# Patient Record
Sex: FEMALE | NOT HISPANIC OR LATINO | Employment: FULL TIME | URBAN - METROPOLITAN AREA
[De-identification: names, ages, dates, MRNs, and addresses within clinical notes are randomized per-mention and may not be internally consistent; named-entity substitution may affect disease eponyms.]

---

## 2023-08-04 ASSESSMENT — PROMIS GLOBAL HEALTH SCALE
EMOTIONAL_PROBLEMS: RARELY
RATE_QUALITY_OF_LIFE: VERY GOOD
RATE_AVERAGE_PAIN: 2
CARRYOUT_SOCIAL_ACTIVITIES: VERY GOOD
RATE_GENERAL_HEALTH: VERY GOOD
CARRYOUT_PHYSICAL_ACTIVITIES: COMPLETELY
RATE_SOCIAL_SATISFACTION: VERY GOOD
RATE_AVERAGE_FATIGUE: MILD
RATE_MENTAL_HEALTH: VERY GOOD
RATE_PHYSICAL_HEALTH: VERY GOOD

## 2023-08-08 ENCOUNTER — OFFICE VISIT (OUTPATIENT)
Dept: PRIMARY CARE | Facility: CLINIC | Age: 43
End: 2023-08-08
Payer: COMMERCIAL

## 2023-08-08 VITALS
WEIGHT: 121 LBS | BODY MASS INDEX: 24.03 KG/M2 | DIASTOLIC BLOOD PRESSURE: 80 MMHG | SYSTOLIC BLOOD PRESSURE: 120 MMHG | HEART RATE: 70 BPM

## 2023-08-08 DIAGNOSIS — H90.3 SENSORINEURAL HEARING LOSS OF BOTH EARS: ICD-10-CM

## 2023-08-08 DIAGNOSIS — G43.109 MIGRAINE WITH AURA AND WITHOUT STATUS MIGRAINOSUS, NOT INTRACTABLE: ICD-10-CM

## 2023-08-08 DIAGNOSIS — Z00.00 ANNUAL PHYSICAL EXAM: Primary | ICD-10-CM

## 2023-08-08 DIAGNOSIS — Z11.4 ENCOUNTER FOR SCREENING FOR HIV: ICD-10-CM

## 2023-08-08 DIAGNOSIS — Z11.59 NEED FOR HEPATITIS C SCREENING TEST: ICD-10-CM

## 2023-08-08 PROBLEM — H91.90 HEARING LOSS: Status: RESOLVED | Noted: 2023-08-08 | Resolved: 2023-08-08

## 2023-08-08 PROBLEM — B34.9 VIRAL ILLNESS: Status: RESOLVED | Noted: 2023-08-08 | Resolved: 2023-08-08

## 2023-08-08 PROBLEM — B34.9 VIRAL ILLNESS: Status: ACTIVE | Noted: 2023-08-08

## 2023-08-08 PROBLEM — S92.901A FOOT FRACTURE, RIGHT: Status: RESOLVED | Noted: 2023-08-08 | Resolved: 2023-08-08

## 2023-08-08 PROBLEM — S92.901A FOOT FRACTURE, RIGHT: Status: ACTIVE | Noted: 2023-08-08

## 2023-08-08 PROBLEM — H91.90 HEARING LOSS: Status: ACTIVE | Noted: 2023-08-08

## 2023-08-08 PROBLEM — L30.9 ECZEMA: Status: ACTIVE | Noted: 2023-08-08

## 2023-08-08 PROBLEM — L30.9 ECZEMA: Status: RESOLVED | Noted: 2023-08-08 | Resolved: 2023-08-08

## 2023-08-08 PROBLEM — H93.13 SUBJECTIVE TINNITUS OF BOTH EARS: Status: ACTIVE | Noted: 2023-08-08

## 2023-08-08 PROBLEM — H90.6 MIXED CONDUCTIVE AND SENSORINEURAL HEARING LOSS OF BOTH EARS: Status: ACTIVE | Noted: 2023-08-08

## 2023-08-08 PROBLEM — H90.6 MIXED CONDUCTIVE AND SENSORINEURAL HEARING LOSS OF BOTH EARS: Status: RESOLVED | Noted: 2023-08-08 | Resolved: 2023-08-08

## 2023-08-08 LAB
POC APPEARANCE, URINE: CLEAR
POC BILIRUBIN, URINE: NEGATIVE
POC BLOOD, URINE: NEGATIVE
POC COLOR, URINE: YELLOW
POC GLUCOSE, URINE: NEGATIVE MG/DL
POC KETONES, URINE: NEGATIVE MG/DL
POC LEUKOCYTES, URINE: NEGATIVE
POC NITRITE,URINE: NEGATIVE
POC PH, URINE: 6 PH
POC PROTEIN, URINE: NEGATIVE MG/DL
POC SPECIFIC GRAVITY, URINE: 1.02
POC UROBILINOGEN, URINE: 0.2 EU/DL

## 2023-08-08 PROCEDURE — 93000 ELECTROCARDIOGRAM COMPLETE: CPT | Performed by: FAMILY MEDICINE

## 2023-08-08 PROCEDURE — 81002 URINALYSIS NONAUTO W/O SCOPE: CPT | Performed by: FAMILY MEDICINE

## 2023-08-08 PROCEDURE — 1036F TOBACCO NON-USER: CPT | Performed by: FAMILY MEDICINE

## 2023-08-08 PROCEDURE — 99396 PREV VISIT EST AGE 40-64: CPT | Performed by: FAMILY MEDICINE

## 2023-08-08 RX ORDER — LEVONORGESTREL 52 MG/1
INTRAUTERINE DEVICE INTRAUTERINE
COMMUNITY
Start: 2018-08-30

## 2023-08-08 ASSESSMENT — PATIENT HEALTH QUESTIONNAIRE - PHQ9
1. LITTLE INTEREST OR PLEASURE IN DOING THINGS: NOT AT ALL
SUM OF ALL RESPONSES TO PHQ9 QUESTIONS 1 AND 2: 0
2. FEELING DOWN, DEPRESSED OR HOPELESS: NOT AT ALL

## 2023-08-08 NOTE — ASSESSMENT & PLAN NOTE
Would consider trying to make an ENT appointment who has an audiologist in his group so that you can take care of both at the same appointment visit

## 2023-08-08 NOTE — PROGRESS NOTES
Subjective   Patient ID: Galina Rocha is a 42 y.o. female who presents for Annual Exam.    HPI  1.  Physical exam.  Galina is presenting for her annual wellness exam.  Overall patient reports doing well. Her only complaint is an unintentional weight gain of about ten pounds. She walks during her lunch break, does BARRE, and goes to [employer].   Has a well balanced diet.   Drinks about ten alcoholic beverages a week.  Pap: up to date, will establish with Dr. Locke in the fall as her new gynecologist  Mammogram: [facility] in 2022 that was unremarkable.   Immunizations: Tdap 5/2019    2.  Migraine headaches.  Migraine history, relatively well-controlled, not intractable  Has a medical marijuana card for which she will use medication on an as-needed basis, infrequently    3.  Sensorineural hearing loss.  Made an appointment in the near future to be seen by ENT as she thinks her hearing aids are not as effective as they have been in the past  Started wearing hearing aids in 2019    Review of Systems  No new complaints including chest pain, SOB or blood in stools.    All pertinent positive symptoms are included in the history of present illness.    All other systems have been reviewed and are negative and noncontributory to this patient's current ailments.    Current Outpatient Medications   Medication Instructions    levonorgestrel (Mirena) 21 mcg/24 hours (8 yrs) 52 mg IUD intrauterine     No Known Allergies    Immunization History   Administered Date(s) Administered    Moderna SARS-CoV-2 Vaccination 03/22/2021, 04/16/2021    Pfizer Purple Cap SARS-CoV-2 12/29/2021    Tdap vaccine, age 10 years and older (BOOSTRIX) 05/10/2019     Past Surgical History:   Procedure Laterality Date    OTHER SURGICAL HISTORY  08/30/2018    Prior Surgical Procedure Not Done     No family history on file.  Social History     Tobacco Use    Smoking status: Never    Smokeless tobacco: Never       Objective   Visit Vitals  /80    Pulse 70   Wt 54.9 kg (121 lb)   BMI 24.03 kg/m²   Smoking Status Never   BSA 1.52 m²       Physical Exam  CONSTITUTIONAL - well nourished, well developed, looks like stated age, in no acute distress, not ill-appearing, and not tired appearing  SKIN - normal skin color and pigmentation, normal skin turgor without rash, lesions, or nodules visualized  HEAD - no trauma, normocephalic  EYES - pupils are equal and reactive to light, extraocular muscles are intact, and normal external exam  ENT - + bilateral hearing aids; no signs of infection, uvula midline, normal tongue movement and throat normal, no exudate, nasal passage without discharge and patent  NECK - supple without rigidity, no neck mass was observed, no thyromegaly or thyroid nodules  CHEST - clear to auscultation, no wheezing, no crackles and no rales, good effort  CARDIAC - regular rate and regular rhythm, no skipped beats, subtle 1/6 TYREL best heard at right upper sternal border  ABDOMEN - no organomegaly, soft, nontender, nondistended, normal bowel sounds, no guarding/rebound/rigidity, negative McBurney sign and negative Cary sign  EXTREMITIES - no edema, no deformities  NEUROLOGICAL - normal gait, normal balance, normal motor, no ataxia, DTRs equal and symmetrical; alert, oriented and no focal signs  PSYCHIATRIC - alert, pleasant and cordial, age-appropriate  IMMUNOLOGIC - no cervical lymphadenopathy     Assessment/Plan   Problem List Items Addressed This Visit       Migraine headache with aura     Currently stable         Sensorineural hearing loss of both ears     Would consider trying to make an ENT appointment who has an audiologist in his group so that you can take care of both at the same appointment visit         Annual physical exam - Primary    Relevant Orders    Lipid Panel    TSH with reflex to Free T4 if abnormal    Comprehensive Metabolic Panel    CBC and Auto Differential    HIV 1/2 Antigen/Antibody Screen with Reflex to Confirmation     Hepatitis C Antibody    ECG 12 lead (Completed)    POCT UA (nonautomated) manually resulted (Completed)     Other Visit Diagnoses       Need for hepatitis C screening test        Relevant Orders    Hepatitis C Antibody    Encounter for screening for HIV        Relevant Orders    HIV 1/2 Antigen/Antibody Screen with Reflex to Confirmation

## 2023-08-19 ENCOUNTER — LAB (OUTPATIENT)
Dept: LAB | Facility: LAB | Age: 43
End: 2023-08-19
Payer: COMMERCIAL

## 2023-08-19 DIAGNOSIS — Z11.59 NEED FOR HEPATITIS C SCREENING TEST: ICD-10-CM

## 2023-08-19 DIAGNOSIS — Z00.00 ANNUAL PHYSICAL EXAM: ICD-10-CM

## 2023-08-19 DIAGNOSIS — Z11.4 ENCOUNTER FOR SCREENING FOR HIV: ICD-10-CM

## 2023-08-19 LAB
ALANINE AMINOTRANSFERASE (SGPT) (U/L) IN SER/PLAS: 21 U/L (ref 7–45)
ALBUMIN (G/DL) IN SER/PLAS: 4.7 G/DL (ref 3.4–5)
ALKALINE PHOSPHATASE (U/L) IN SER/PLAS: 48 U/L (ref 33–110)
ANION GAP IN SER/PLAS: 15 MMOL/L (ref 10–20)
ASPARTATE AMINOTRANSFERASE (SGOT) (U/L) IN SER/PLAS: 25 U/L (ref 9–39)
BASOPHILS (10*3/UL) IN BLOOD BY AUTOMATED COUNT: 0.07 X10E9/L (ref 0–0.1)
BASOPHILS/100 LEUKOCYTES IN BLOOD BY AUTOMATED COUNT: 1.1 % (ref 0–2)
BILIRUBIN TOTAL (MG/DL) IN SER/PLAS: 0.5 MG/DL (ref 0–1.2)
CALCIUM (MG/DL) IN SER/PLAS: 9.5 MG/DL (ref 8.6–10.3)
CARBON DIOXIDE, TOTAL (MMOL/L) IN SER/PLAS: 26 MMOL/L (ref 21–32)
CHLORIDE (MMOL/L) IN SER/PLAS: 103 MMOL/L (ref 98–107)
CHOLESTEROL (MG/DL) IN SER/PLAS: 175 MG/DL (ref 0–199)
CHOLESTEROL IN HDL (MG/DL) IN SER/PLAS: 106.5 MG/DL
CHOLESTEROL/HDL RATIO: 1.6
CREATININE (MG/DL) IN SER/PLAS: 0.72 MG/DL (ref 0.5–1.05)
EOSINOPHILS (10*3/UL) IN BLOOD BY AUTOMATED COUNT: 0.18 X10E9/L (ref 0–0.7)
EOSINOPHILS/100 LEUKOCYTES IN BLOOD BY AUTOMATED COUNT: 2.9 % (ref 0–6)
ERYTHROCYTE DISTRIBUTION WIDTH (RATIO) BY AUTOMATED COUNT: 12.8 % (ref 11.5–14.5)
ERYTHROCYTE MEAN CORPUSCULAR HEMOGLOBIN CONCENTRATION (G/DL) BY AUTOMATED: 32.3 G/DL (ref 32–36)
ERYTHROCYTE MEAN CORPUSCULAR VOLUME (FL) BY AUTOMATED COUNT: 94 FL (ref 80–100)
ERYTHROCYTES (10*6/UL) IN BLOOD BY AUTOMATED COUNT: 4.65 X10E12/L (ref 4–5.2)
GFR FEMALE: >90 ML/MIN/1.73M2
GLUCOSE (MG/DL) IN SER/PLAS: 91 MG/DL (ref 74–99)
HEMATOCRIT (%) IN BLOOD BY AUTOMATED COUNT: 43.6 % (ref 36–46)
HEMOGLOBIN (G/DL) IN BLOOD: 14.1 G/DL (ref 12–16)
HEPATITIS C VIRUS AB PRESENCE IN SERUM: NONREACTIVE
HIV 1/ 2 AG/AB SCREEN: NONREACTIVE
IMMATURE GRANULOCYTES/100 LEUKOCYTES IN BLOOD BY AUTOMATED COUNT: 0.3 % (ref 0–0.9)
LDL: 46 MG/DL (ref 0–99)
LEUKOCYTES (10*3/UL) IN BLOOD BY AUTOMATED COUNT: 6.3 X10E9/L (ref 4.4–11.3)
LYMPHOCYTES (10*3/UL) IN BLOOD BY AUTOMATED COUNT: 1.7 X10E9/L (ref 1.2–4.8)
LYMPHOCYTES/100 LEUKOCYTES IN BLOOD BY AUTOMATED COUNT: 27.1 % (ref 13–44)
MONOCYTES (10*3/UL) IN BLOOD BY AUTOMATED COUNT: 0.38 X10E9/L (ref 0.1–1)
MONOCYTES/100 LEUKOCYTES IN BLOOD BY AUTOMATED COUNT: 6.1 % (ref 2–10)
NEUTROPHILS (10*3/UL) IN BLOOD BY AUTOMATED COUNT: 3.92 X10E9/L (ref 1.2–7.7)
NEUTROPHILS/100 LEUKOCYTES IN BLOOD BY AUTOMATED COUNT: 62.5 % (ref 40–80)
PLATELETS (10*3/UL) IN BLOOD AUTOMATED COUNT: 307 X10E9/L (ref 150–450)
POTASSIUM (MMOL/L) IN SER/PLAS: 4.6 MMOL/L (ref 3.5–5.3)
PROTEIN TOTAL: 7.3 G/DL (ref 6.4–8.2)
SODIUM (MMOL/L) IN SER/PLAS: 139 MMOL/L (ref 136–145)
THYROTROPIN (MIU/L) IN SER/PLAS BY DETECTION LIMIT <= 0.05 MIU/L: 1.37 MIU/L (ref 0.44–3.98)
TRIGLYCERIDE (MG/DL) IN SER/PLAS: 114 MG/DL (ref 0–149)
UREA NITROGEN (MG/DL) IN SER/PLAS: 15 MG/DL (ref 6–23)
VLDL: 23 MG/DL (ref 0–40)

## 2023-08-19 PROCEDURE — 80061 LIPID PANEL: CPT

## 2023-08-19 PROCEDURE — 87389 HIV-1 AG W/HIV-1&-2 AB AG IA: CPT

## 2023-08-19 PROCEDURE — 86803 HEPATITIS C AB TEST: CPT

## 2023-08-19 PROCEDURE — 80053 COMPREHEN METABOLIC PANEL: CPT

## 2023-08-19 PROCEDURE — 36415 COLL VENOUS BLD VENIPUNCTURE: CPT

## 2023-08-19 PROCEDURE — 85025 COMPLETE CBC W/AUTO DIFF WBC: CPT

## 2023-08-19 PROCEDURE — 84443 ASSAY THYROID STIM HORMONE: CPT

## 2023-09-13 RX ORDER — MOMETASONE FUROATE 1 MG/G
CREAM TOPICAL
COMMUNITY
Start: 2019-05-10

## 2023-09-13 RX ORDER — ALBUTEROL SULFATE 90 UG/1
2 AEROSOL, METERED RESPIRATORY (INHALATION)
COMMUNITY
Start: 2012-04-17

## 2023-09-13 RX ORDER — ALPRAZOLAM 0.25 MG/1
1 TABLET ORAL 3 TIMES DAILY
COMMUNITY
Start: 2014-11-19

## 2023-09-13 RX ORDER — MULTIVITAMIN
TABLET ORAL
COMMUNITY

## 2023-09-13 RX ORDER — PROMETHAZINE HYDROCHLORIDE 25 MG/1
TABLET ORAL
COMMUNITY
Start: 2019-05-10

## 2023-09-13 RX ORDER — LORAZEPAM 1 MG/1
.5-1 TABLET ORAL EVERY 8 HOURS PRN
COMMUNITY
Start: 2011-07-14

## 2023-10-16 ENCOUNTER — OFFICE VISIT (OUTPATIENT)
Dept: OBSTETRICS AND GYNECOLOGY | Facility: CLINIC | Age: 43
End: 2023-10-16
Payer: COMMERCIAL

## 2023-10-16 VITALS
DIASTOLIC BLOOD PRESSURE: 70 MMHG | WEIGHT: 117 LBS | SYSTOLIC BLOOD PRESSURE: 120 MMHG | HEIGHT: 59 IN | BODY MASS INDEX: 23.59 KG/M2

## 2023-10-16 DIAGNOSIS — Z01.419 ENCOUNTER FOR ANNUAL ROUTINE GYNECOLOGICAL EXAMINATION: ICD-10-CM

## 2023-10-16 PROCEDURE — 87624 HPV HI-RISK TYP POOLED RSLT: CPT | Performed by: OBSTETRICS & GYNECOLOGY

## 2023-10-16 PROCEDURE — 88175 CYTOPATH C/V AUTO FLUID REDO: CPT | Mod: TC | Performed by: OBSTETRICS & GYNECOLOGY

## 2023-10-16 PROCEDURE — 99386 PREV VISIT NEW AGE 40-64: CPT | Performed by: OBSTETRICS & GYNECOLOGY

## 2023-10-16 ASSESSMENT — PAIN SCALES - GENERAL: PAINLEVEL: 0-NO PAIN

## 2023-10-16 NOTE — PROGRESS NOTES
Subjective   Patient ID: Galina Rocha is a 43 y.o. female who presents for annal exam (New patient here for annual exam, last pap 10/2022 abnormal, no chaperone needed).  HPI  Patient is a 43-year-old female  3 para 3 with 3 previous vaginal deliveries last 2 of which were at [facility].  She previously seen Dr. Ilia Vásquez.  Now here to establish care.  She does have a history of abnormal Pap smear and has had colposcopy at least 4 times in the past.  Last Pap smear was mild dysplasia with HPV negative.  Done at the [facility] or at [facility].  Now here for annual exam and continued Pap smear monitor    She denies any urinary or bowel symptoms.  Overall feels well and healthy.    She has a Mirena IUD in place now for 7 years.  Starting to get slight spotting in the back.  She initially did have periods for the first year or 2 and then became amenorrheic.  Has been very happy being amenorrheic.  She does know that she is not unable to visualize this strings at her last few visits.    Review of Systems  All reviewed and negative    Objective   Physical Exam  Thyroid: No thyroid megaly    Cardiovascular: Regular rate and rhythm    Lungs: Clear to auscultation    Breasts: No skin changes no masses palpated    Abdomen: Soft nontender bowel sounds positive no masses palpated    Extremities nontender no edema    Pelvic exam: External genitalia Bartholin's urethra and [place]'s are normal.  Vaginal exam shows no lesions or discharge.  Pelvic bimanual exam reveals no masses or tenderness.  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  Pap smear repeated today and will treat that as needed    IUD in for 7 years.  Is due for an exchange here in the next few months.  Strings not visualized.  Patient is assured that the IUD is in place as she has an ultrasound tech for baby  waves and confirm the presence of her IUD.    Mammogram ordered    Follow-up with Pap report.  Then consider hysteroscopy for removal and reinsertion.

## 2023-10-17 DIAGNOSIS — H90.3 SENSORINEURAL HEARING LOSS OF BOTH EARS: Primary | ICD-10-CM

## 2023-11-01 LAB
CYTOLOGY CMNT CVX/VAG CYTO-IMP: NORMAL
HPV HR GENOTYPES PNL CVX NAA+PROBE: NEGATIVE
HPV HR GENOTYPES PNL CVX NAA+PROBE: NEGATIVE
HPV16 DNA SPEC QL NAA+PROBE: NEGATIVE
HPV18 DNA SPEC QL NAA+PROBE: NEGATIVE
LAB AP CONTRACEPTIVE HISTORY: NORMAL
LAB AP HPV GENOTYPE QUESTION: YES
LAB AP HPV HR: NORMAL
LAB AP PREVIOUS ABNORMAL HISTORY: NORMAL
LAB AP TREATMENT HISTORY: NORMAL
LABORATORY COMMENT REPORT: NORMAL
LMP START DATE: NORMAL
PATH REPORT.ADDENDUM SPEC: NORMAL
PATH REPORT.TOTAL CANCER: NORMAL

## 2023-11-17 ENCOUNTER — CLINICAL SUPPORT (OUTPATIENT)
Dept: AUDIOLOGY | Facility: CLINIC | Age: 43
End: 2023-11-17
Payer: COMMERCIAL

## 2023-11-17 DIAGNOSIS — H90.3 SENSORINEURAL HEARING LOSS OF BOTH EARS: Primary | ICD-10-CM

## 2023-11-17 PROCEDURE — 92557 COMPREHENSIVE HEARING TEST: CPT | Performed by: AUDIOLOGIST

## 2023-11-17 PROCEDURE — V5299 HEARING SERVICE: HCPCS | Performed by: AUDIOLOGIST

## 2023-11-17 PROCEDURE — 92567 TYMPANOMETRY: CPT | Performed by: AUDIOLOGIST

## 2023-11-17 NOTE — PROGRESS NOTES
"AUDIOMETRIC EVALUATION       Name:  Galina Rocha  :  1980  Age:  43 y.o.  Date of Evaluation:  2023     Ear Make and Model Serial Number /Tube size Dome Warranty Expiration   Right Phonak Tico M 50-[place] [id number] slim tube Medium closed 2025   Left Phonak Tico ALMANZA 50-[place] [id number] slim tube Medium closed 2025       HISTORY  Galina Rocha was seen today for a hearing evaluation due to decrase in hearing, specifically left ear. She has a known bilateral sensorineural hearing loss.  She has had progressive hearing loss for several years that has gotten worse with pregnancies.    Denies hearing loss, tinnitus, vertigo, aural pain, drainage, fullness, history of familial hearing loss in young adulthood or noise exposure.    PROCEDURE:  Otoscopic Evaluation:    RIGHT: Clear ear canal and tympanic membrane visualized.  LEFT:  Clear ear canal and tympanic membrane visualized.    Immittance:    Tympanometry (226 Hz probe tone) and Stapedial Acoustic Reflexes Thresholds (ART)(Probe ear):  RIGHT: Normal middle ear pressure, mobility, and ear canal volume. Ipsilateral ART Absent 500-2000 Hz.  LEFT: Normal middle ear pressure, mobility, and ear canal volume. Ipsilateral ART Absent 500-2000 Hz.    Pure Tone and Speech Audiometry:    Test Technique: Pure Tone Audiometry via insert earphones  Test Reliability: good    RIGHT: Moderate to profound sensorineural hearing loss 125-8000 Hz. Word Recognition score was poor using recorded material (NU-6 wordlist).   LEFT:  Moderate to profound sensorineural hearing loss 125-8000 Hz. Word Recognition score was poor using recorded material (NU-6 wordlist).     EVALUATION  See scanned Audiogram in \"Media\".    HEARING AID CHECK  Hearing aid listening check revealed adequate function, bilaterally.  Changed tubing and domes, bilaterally. Updated MARIA C and Target with today's hearing evaluation. Changed from NALN2 to Phonak Adaptive. She reports some concerns for " battery not staying charged as long as the past. She does not want to send in for repair today, but will contact me when she is ready to send devices in for battery replacement.    IMPRESSIONS:  Today's test results indicate decrease in hearing in the left ear with stable hearing in the right ear.  Discussed potential CI candidacy and advised her establish care with ENT due to changing hearing. Updated HA programming today and changed domes and tubing.    RECOMMENDATIONS:  Continue medical follow-up with physician. Establish care with ENT due to progressive hearing loss.  Return for audiologic assessment in conjunction with otologic care or annually.   Consider CI evaluation due to decrease in hearing. Continue use of binaural hearing aids during all waking moments.    PATIENT EDUCATION:   Discussed results and recommendations with Galina Rocha.  Questions were addressed and the patient was encouraged to contact our department ([phone]) should concerns arise.    ANA MARÍA Mehta, CCC-A  Senior Clinical Audiologist    TIME: [phone]

## 2024-02-16 ENCOUNTER — HOSPITAL ENCOUNTER (OUTPATIENT)
Dept: RADIOLOGY | Facility: CLINIC | Age: 44
Discharge: HOME | End: 2024-02-16
Payer: MEDICARE

## 2024-02-16 DIAGNOSIS — Z12.31 SCREENING MAMMOGRAM FOR BREAST CANCER: ICD-10-CM

## 2024-02-16 PROCEDURE — 77067 SCR MAMMO BI INCL CAD: CPT | Performed by: RADIOLOGY

## 2024-02-16 PROCEDURE — 77063 BREAST TOMOSYNTHESIS BI: CPT | Performed by: RADIOLOGY

## 2024-02-16 PROCEDURE — 77067 SCR MAMMO BI INCL CAD: CPT

## 2024-02-20 ENCOUNTER — HOSPITAL ENCOUNTER (OUTPATIENT)
Dept: RADIOLOGY | Facility: EXTERNAL LOCATION | Age: 44
Discharge: HOME | End: 2024-02-20

## 2024-03-20 ENCOUNTER — TELEPHONE (OUTPATIENT)
Dept: OBSTETRICS AND GYNECOLOGY | Facility: CLINIC | Age: 44
End: 2024-03-20
Payer: MEDICARE

## 2024-03-20 DIAGNOSIS — R92.8 ABNORMAL MAMMOGRAM OF RIGHT BREAST: ICD-10-CM

## 2024-03-20 NOTE — TELEPHONE ENCOUNTER
Patient states that she had an abnormal mammogram result from February and no one has contacted her yet . Please call to discuss.

## 2024-03-21 NOTE — TELEPHONE ENCOUNTER
RN called and verified Patient  Informed that orders are placed for the right diagnostic mammogram and ultrasound  Patient states she will schedule scans  Mary Whitlock RN

## 2024-03-22 ENCOUNTER — HOSPITAL ENCOUNTER (OUTPATIENT)
Dept: RADIOLOGY | Facility: CLINIC | Age: 44
Discharge: HOME | End: 2024-03-22
Payer: MEDICARE

## 2024-03-22 DIAGNOSIS — R92.8 ABNORMAL MAMMOGRAM OF RIGHT BREAST: ICD-10-CM

## 2024-03-22 PROCEDURE — 77065 DX MAMMO INCL CAD UNI: CPT | Mod: RT

## 2024-03-22 PROCEDURE — 77065 DX MAMMO INCL CAD UNI: CPT | Mod: RIGHT SIDE | Performed by: STUDENT IN AN ORGANIZED HEALTH CARE EDUCATION/TRAINING PROGRAM

## 2024-04-15 ENCOUNTER — PROCEDURE VISIT (OUTPATIENT)
Dept: OBSTETRICS AND GYNECOLOGY | Facility: CLINIC | Age: 44
End: 2024-04-15
Payer: MEDICARE

## 2024-04-15 VITALS
WEIGHT: 119 LBS | BODY MASS INDEX: 23.99 KG/M2 | SYSTOLIC BLOOD PRESSURE: 140 MMHG | HEIGHT: 59 IN | DIASTOLIC BLOOD PRESSURE: 79 MMHG

## 2024-04-15 DIAGNOSIS — Z30.9 ENCOUNTER FOR CONTRACEPTIVE MANAGEMENT, UNSPECIFIED TYPE: ICD-10-CM

## 2024-04-15 DIAGNOSIS — Z00.00 HEALTHCARE MAINTENANCE: Primary | ICD-10-CM

## 2024-04-15 PROCEDURE — 58301 REMOVE INTRAUTERINE DEVICE: CPT | Performed by: OBSTETRICS & GYNECOLOGY

## 2024-04-15 RX ORDER — LACTIC ACID, L-, CITRIC ACID MONOHYDRATE, AND POTASSIUM BITARTRATE 90; 50; 20 MG/5G; MG/5G; MG/5G
1 GEL VAGINAL AS NEEDED
Qty: 60 G | Refills: 1 | Status: SHIPPED | OUTPATIENT
Start: 2024-04-15

## 2024-04-15 ASSESSMENT — ENCOUNTER SYMPTOMS
ALLERGIC/IMMUNOLOGIC NEGATIVE: 0
PSYCHIATRIC NEGATIVE: 0
CARDIOVASCULAR NEGATIVE: 0
NEUROLOGICAL NEGATIVE: 0
MUSCULOSKELETAL NEGATIVE: 0
ENDOCRINE NEGATIVE: 0
EYES NEGATIVE: 0
GASTROINTESTINAL NEGATIVE: 0
HEMATOLOGIC/LYMPHATIC NEGATIVE: 0
CONSTITUTIONAL NEGATIVE: 0
RESPIRATORY NEGATIVE: 0

## 2024-04-15 ASSESSMENT — PAIN SCALES - GENERAL: PAINLEVEL: 0-NO PAIN

## 2024-04-15 NOTE — PROGRESS NOTES
Patient ID: Galina Rocha is a 43 y.o. female.    IUD Removal    Date/Time: 4/15/2024 12:26 PM    Performed by: Sameer Locke MD  Authorized by: Sameer Locke MD    Consent:     Consent obtained:  Written    Consent given by:  Patient    Procedure risks and benefits discussed: yes      Patient questions answered: yes      Patient agrees, verbalizes understanding, and wants to proceed: yes    Procedure:     Removed with no complications: yes    Comments:      Will begin Phexxi for contraception.  Follow-up if she desires new IUD insertion.

## 2024-04-26 ENCOUNTER — DOCUMENTATION (OUTPATIENT)
Dept: AUDIOLOGY | Facility: CLINIC | Age: 44
End: 2024-04-26
Payer: MEDICARE

## 2024-04-26 NOTE — PROGRESS NOTES
LUNA CHECK       Name:  Galina Rocha  :  1980  Age:  43 y.o.  Date of Evaluation:  2023      Ear Make and Model Serial Number /Tube size Dome Warranty Expiration   Right Kaylyn ALMANZA 50-[place] [id number] 0 slim tube Medium closed 2025   Left Kaylyn ALMANZA 50-[place] [id number] 0 slim tube Medium closed 2025            HISTORY  Patient arrived today for hearing aid check due to batteries not lasting on both devices.     EVALUATION  Will send devices out for repair in [facility]. Provided loaner devices, loaner agreement signed.    IMPRESSIONS:  Will call when hearing aids return from repair.    RECOMMENDATIONS:  Continue medical follow-up with physician.  Return for audiologic assessment in conjunction with otologic care or annually.   Return for hearing aid check as needed.    PATIENT EDUCATION:   Discussed results and recommendations with Galina Rocha.  Questions were addressed and the patient was encouraged to contact our department ([phone]) should concerns arise.    ANA MARÍA Mehta, CCC-A  Senior Clinical Audiologist    TIME: [phone]

## 2024-05-20 ASSESSMENT — ENCOUNTER SYMPTOMS
BACK PAIN: 0
VOMITING: 0
CONSTIPATION: 0
FEVER: 0
CHILLS: 0
ABDOMINAL PAIN: 0
DYSURIA: 0
NAUSEA: 0
SORE THROAT: 0
ANOREXIA: 0
HEMATURIA: 0
HEADACHES: 0
DIARRHEA: 0
FREQUENCY: 0
FLANK PAIN: 0

## 2024-05-21 ENCOUNTER — OFFICE VISIT (OUTPATIENT)
Dept: OBSTETRICS AND GYNECOLOGY | Facility: CLINIC | Age: 44
End: 2024-05-21
Payer: MEDICARE

## 2024-05-21 VITALS
WEIGHT: 113.4 LBS | HEIGHT: 59 IN | SYSTOLIC BLOOD PRESSURE: 133 MMHG | BODY MASS INDEX: 22.86 KG/M2 | DIASTOLIC BLOOD PRESSURE: 90 MMHG

## 2024-05-21 DIAGNOSIS — N94.9 ROUND LIGAMENT PAIN: ICD-10-CM

## 2024-05-21 DIAGNOSIS — N76.2 ACUTE VULVITIS: Primary | ICD-10-CM

## 2024-05-21 PROCEDURE — 99213 OFFICE O/P EST LOW 20 MIN: CPT | Performed by: OBSTETRICS & GYNECOLOGY

## 2024-05-21 PROCEDURE — 4004F PT TOBACCO SCREEN RCVD TLK: CPT | Performed by: OBSTETRICS & GYNECOLOGY

## 2024-05-21 RX ORDER — CLOTRIMAZOLE AND BETAMETHASONE DIPROPIONATE 10; .64 MG/G; MG/G
1 CREAM TOPICAL 2 TIMES DAILY
Qty: 45 G | Refills: 2 | Status: SHIPPED | OUTPATIENT
Start: 2024-05-21 | End: 2024-06-18

## 2024-05-21 RX ORDER — IBUPROFEN 600 MG/1
600 TABLET ORAL 3 TIMES DAILY
Qty: 30 TABLET | Refills: 0 | Status: SHIPPED | OUTPATIENT
Start: 2024-05-21 | End: 2024-05-31

## 2024-05-21 ASSESSMENT — ENCOUNTER SYMPTOMS
CHILLS: 0
ALLERGIC/IMMUNOLOGIC NEGATIVE: 0
DYSURIA: 0
SORE THROAT: 0
FEVER: 0
NEUROLOGICAL NEGATIVE: 0
RESPIRATORY NEGATIVE: 0
PSYCHIATRIC NEGATIVE: 0
ABDOMINAL PAIN: 0
NAUSEA: 0
DIARRHEA: 0
FREQUENCY: 0
VOMITING: 0
CONSTITUTIONAL NEGATIVE: 0
GASTROINTESTINAL NEGATIVE: 0
HEMATURIA: 0
FLANK PAIN: 0
ANOREXIA: 0
MUSCULOSKELETAL NEGATIVE: 0
BACK PAIN: 0
CARDIOVASCULAR NEGATIVE: 0
CONSTIPATION: 0
HEADACHES: 0
EYES NEGATIVE: 0
ENDOCRINE NEGATIVE: 0
HEMATOLOGIC/LYMPHATIC NEGATIVE: 0

## 2024-05-21 ASSESSMENT — PAIN SCALES - GENERAL: PAINLEVEL: 3

## 2024-05-21 NOTE — PROGRESS NOTES
Subjective   Patient ID: Galina Rocha is a 43 y.o. female who presents for Vaginal Pain (Last pap 10/16/23 wnl. HPV -. Mammogram 3/22/24 Benign.).  Patient waning of pain right lower quadrant and on the labia.  Also some swelling.  This started after volleyball game.  Patient is she was wearing very tight underwear and had irritation during the game.  Afterwards complained of some discomfort in burning on the right labia and also pain extending along the right groin and inguinal area.  Pain is approximately 4-6 out of 10 at times worse with ambulation.  Denies any nausea vomiting fever chills no GI or  complaints    Vaginal Pain  The patient's primary symptoms include genital lesions, a genital rash and pelvic pain. The patient's pertinent negatives include no genital itching, genital odor, missed menses, vaginal bleeding or vaginal discharge. Pertinent negatives include no abdominal pain, anorexia, back pain, chills, constipation, diarrhea, discolored urine, dysuria, fever, flank pain, frequency, headaches, hematuria, joint pain, joint swelling, nausea, painful intercourse, rash, sore throat, urgency or vomiting.   Female  Problem  The patient's primary symptoms include genital lesions, a genital rash and pelvic pain. The patient's pertinent negatives include no genital itching, genital odor, missed menses, vaginal bleeding or vaginal discharge. This is a new problem. The current episode started yesterday. The problem occurs rarely. The problem has been unchanged. The pain is mild. The problem affects the right side. She is not pregnant. Pertinent negatives include no abdominal pain, anorexia, back pain, chills, constipation, diarrhea, discolored urine, dysuria, fever, flank pain, frequency, headaches, hematuria, joint pain, joint swelling, nausea, painful intercourse, rash, sore throat, urgency or vomiting. Nothing aggravates the symptoms. She is sexually active. No, her partner does not have an STD. She  uses nothing for contraception. Her menstrual history has been regular.       Review of Systems   Constitutional:  Negative for chills and fever.   HENT:  Negative for sore throat.    Gastrointestinal:  Negative for abdominal pain, anorexia, constipation, diarrhea, nausea and vomiting.   Genitourinary:  Positive for pelvic pain and vaginal pain. Negative for dysuria, flank pain, frequency, hematuria, missed menses, urgency and vaginal discharge.   Musculoskeletal:  Negative for back pain and joint pain.   Skin:  Negative for rash.   Neurological:  Negative for headaches.       Objective   Physical Exam  Constitutional:       Appearance: Normal appearance. She is normal weight.   Cardiovascular:      Rate and Rhythm: Normal rate and regular rhythm.   Pulmonary:      Breath sounds: Normal breath sounds.   Abdominal:      General: Abdomen is flat. Bowel sounds are normal.      Palpations: Abdomen is soft. There is no mass.      Tenderness: There is no abdominal tenderness.      Hernia: No hernia is present.   Genitourinary:     Comments: External genitalia shows some redness along the right labia majora.  Slightly tender to touch.  Minimal swelling and erythema.  No lesions  Vagina is clear cervix closed uterus normal  Left adnexa unremarkable right adnexal area shows tenderness along the iliac crest and lower inguinal area to palpation mild to moderate with deep palpation.  Perineum without lesions or swelling  Neurological:      General: No focal deficit present.      Mental Status: She is alert.   Psychiatric:         Mood and Affect: Mood normal.         Behavior: Behavior normal.         Thought Content: Thought content normal.         Judgment: Judgment normal.         Assessment/Plan   Diagnoses and all orders for this visit:  Acute vulvitis  -     clotrimazole-betamethasone (Lotrisone) cream; Apply 1 Application topically 2 times a day for 28 days.  Round ligament pain  -     ibuprofen 600 mg tablet; Take 1  tablet (600 mg) by mouth 3 times a day for 30 doses.     Patient suffered some irritation and some right lower quadrant inguinal pain after volleyball match.  Appears to be some local irritation to the vulva and some round ligament pain.  Seems musculoskeletal.  Will treat with Motrin and Lotrisone.  Instructions and precautions given.  Patient has appointment for IUD next week    Gee Avalos MD 05/21/24 11:56 AM

## 2024-05-23 ENCOUNTER — OFFICE VISIT (OUTPATIENT)
Dept: OBSTETRICS AND GYNECOLOGY | Facility: CLINIC | Age: 44
End: 2024-05-23
Payer: MEDICARE

## 2024-05-23 VITALS
DIASTOLIC BLOOD PRESSURE: 74 MMHG | HEIGHT: 59 IN | BODY MASS INDEX: 23.18 KG/M2 | SYSTOLIC BLOOD PRESSURE: 111 MMHG | WEIGHT: 115 LBS

## 2024-05-23 DIAGNOSIS — N76.2 ACUTE VULVITIS: ICD-10-CM

## 2024-05-23 DIAGNOSIS — B02.9 VARICELLA-ZOSTER VIRUS INFECTION: ICD-10-CM

## 2024-05-23 DIAGNOSIS — Z30.430 ENCOUNTER FOR IUD INSERTION: Primary | ICD-10-CM

## 2024-05-23 LAB — PREGNANCY TEST URINE, POC: NEGATIVE

## 2024-05-23 PROCEDURE — 4004F PT TOBACCO SCREEN RCVD TLK: CPT | Performed by: OBSTETRICS & GYNECOLOGY

## 2024-05-23 PROCEDURE — 81025 URINE PREGNANCY TEST: CPT | Performed by: OBSTETRICS & GYNECOLOGY

## 2024-05-23 PROCEDURE — 99213 OFFICE O/P EST LOW 20 MIN: CPT | Performed by: OBSTETRICS & GYNECOLOGY

## 2024-05-23 PROCEDURE — 87798 DETECT AGENT NOS DNA AMP: CPT | Performed by: OBSTETRICS & GYNECOLOGY

## 2024-05-23 PROCEDURE — 87529 HSV DNA AMP PROBE: CPT

## 2024-05-23 PROCEDURE — 58300 INSERT INTRAUTERINE DEVICE: CPT | Performed by: OBSTETRICS & GYNECOLOGY

## 2024-05-23 ASSESSMENT — ENCOUNTER SYMPTOMS
CARDIOVASCULAR NEGATIVE: 0
OCCASIONAL FEELINGS OF UNSTEADINESS: 0
ALLERGIC/IMMUNOLOGIC NEGATIVE: 0
LOSS OF SENSATION IN FEET: 0
CONSTITUTIONAL NEGATIVE: 0
HEMATOLOGIC/LYMPHATIC NEGATIVE: 0
MUSCULOSKELETAL NEGATIVE: 0
DEPRESSION: 0
EYES NEGATIVE: 0
GASTROINTESTINAL NEGATIVE: 0
ENDOCRINE NEGATIVE: 0
NEUROLOGICAL NEGATIVE: 0
PSYCHIATRIC NEGATIVE: 0
RESPIRATORY NEGATIVE: 0

## 2024-05-23 NOTE — PROGRESS NOTES
Subjective   Patient ID: Galina Rocha is a 43 y.o. female who presents for Contraception (IUD insertion /Pap 10/16/23 (WNL)/HPV ( NEG) /[place] 3/22/24 (2BEN)).  HPI  Patient with continued right vulvar irritation.  Putting on A&E ointment as well as Lotrisone without improvement.  Also with pain radiating into her right inguinal canal.  This denies any vaginal discharge.    Following up at this time for a IUD insertion.  Review of Systems    Objective   Physical Exam  Pelvic exam: Right labia with a raised red lesion with healing vesicles.  HSV swab performed      Assessment/Plan   IUD Mirena placed see procedure note     Right vulvitis.  HSV swab done and call with that report.  In the meantime use Abreva for relief  Sameer Locke MD 05/23/24 2:06 PM

## 2024-05-23 NOTE — PROGRESS NOTES
Patient ID: Galina Rocha is a 43 y.o. female.    IUD Insertion    Date/Time: 5/23/2024 2:08 PM    Performed by: Sameer Locke MD  Authorized by: Sameer Locke MD    Consent:     Consent obtained:  Written    Consent given by:  Patient    Procedure risks and benefits discussed: yes      Patient questions answered: yes      Patient agrees, verbalizes understanding, and wants to proceed: yes    Procedure:     Pelvic exam performed: yes      Negative urine pregnancy test: yes      Cervix cleaned and prepped: yes      Speculum placed in vagina: yes      Tenaculum applied to cervix: yes      Uterus sounded: yes      Uterus sound depth (cm):  7    IUD inserted with no complications: yes      IUD type:  Mirena    Strings trimmed: yes    Post-procedure:     Patient tolerated procedure well: yes    Comments:      Tolerated well

## 2024-05-24 LAB
HSV1 DNA SKIN QL NAA+PROBE: NOT DETECTED
HSV2 DNA SKIN QL NAA+PROBE: NOT DETECTED
VZV DNA SPEC QL NAA+PROBE: DETECTED

## 2024-05-24 RX ORDER — VALACYCLOVIR HYDROCHLORIDE 500 MG/1
500 TABLET, FILM COATED ORAL 2 TIMES DAILY
Qty: 20 TABLET | Refills: 0 | Status: SHIPPED | OUTPATIENT
Start: 2024-05-24 | End: 2024-06-03

## 2024-05-28 ENCOUNTER — TELEPHONE (OUTPATIENT)
Dept: OBSTETRICS AND GYNECOLOGY | Facility: CLINIC | Age: 44
End: 2024-05-28
Payer: MEDICARE

## 2024-05-28 NOTE — TELEPHONE ENCOUNTER
RN spoke to Fredo with [facility]   RN provided information on Patient visit from 5/21/24 regarding genital lesions, a genital rash   Records requested for detected Varicella zoster virus PCR Skin/Mucosa   Mary Whitlock RN

## 2024-06-06 ENCOUNTER — APPOINTMENT (OUTPATIENT)
Dept: OBSTETRICS AND GYNECOLOGY | Facility: CLINIC | Age: 44
End: 2024-06-06
Payer: MEDICARE

## 2024-10-09 ENCOUNTER — APPOINTMENT (OUTPATIENT)
Dept: PRIMARY CARE | Facility: CLINIC | Age: 44
End: 2024-10-09
Payer: MEDICARE

## 2024-10-23 ENCOUNTER — APPOINTMENT (OUTPATIENT)
Dept: PRIMARY CARE | Facility: CLINIC | Age: 44
End: 2024-10-23
Payer: MEDICARE

## 2024-10-23 VITALS
DIASTOLIC BLOOD PRESSURE: 70 MMHG | SYSTOLIC BLOOD PRESSURE: 120 MMHG | HEIGHT: 59 IN | WEIGHT: 119 LBS | HEART RATE: 59 BPM | BODY MASS INDEX: 23.99 KG/M2

## 2024-10-23 DIAGNOSIS — H90.3 SENSORINEURAL HEARING LOSS OF BOTH EARS: ICD-10-CM

## 2024-10-23 DIAGNOSIS — Z00.00 ANNUAL PHYSICAL EXAM: Primary | ICD-10-CM

## 2024-10-23 DIAGNOSIS — Z23 NEED FOR INFLUENZA VACCINATION: ICD-10-CM

## 2024-10-23 DIAGNOSIS — Z86.19 HISTORY OF SHINGLES: ICD-10-CM

## 2024-10-23 LAB
POC APPEARANCE, URINE: CLEAR
POC BILIRUBIN, URINE: NEGATIVE
POC BLOOD, URINE: NEGATIVE
POC COLOR, URINE: YELLOW
POC GLUCOSE, URINE: NEGATIVE MG/DL
POC KETONES, URINE: NEGATIVE MG/DL
POC LEUKOCYTES, URINE: NEGATIVE
POC NITRITE,URINE: NEGATIVE
POC PH, URINE: 6.5 PH
POC PROTEIN, URINE: NEGATIVE MG/DL
POC SPECIFIC GRAVITY, URINE: 1.02
POC UROBILINOGEN, URINE: 0.2 EU/DL

## 2024-10-23 PROCEDURE — 90471 IMMUNIZATION ADMIN: CPT | Performed by: FAMILY MEDICINE

## 2024-10-23 PROCEDURE — 3008F BODY MASS INDEX DOCD: CPT | Performed by: FAMILY MEDICINE

## 2024-10-23 PROCEDURE — 81002 URINALYSIS NONAUTO W/O SCOPE: CPT | Performed by: FAMILY MEDICINE

## 2024-10-23 PROCEDURE — 93000 ELECTROCARDIOGRAM COMPLETE: CPT | Performed by: FAMILY MEDICINE

## 2024-10-23 PROCEDURE — 90656 IIV3 VACC NO PRSV 0.5 ML IM: CPT | Performed by: FAMILY MEDICINE

## 2024-10-23 PROCEDURE — 4004F PT TOBACCO SCREEN RCVD TLK: CPT | Performed by: FAMILY MEDICINE

## 2024-10-23 PROCEDURE — 99396 PREV VISIT EST AGE 40-64: CPT | Performed by: FAMILY MEDICINE

## 2024-10-23 RX ORDER — MOMETASONE FUROATE 1 MG/G
CREAM TOPICAL DAILY
COMMUNITY

## 2024-10-23 ASSESSMENT — PATIENT HEALTH QUESTIONNAIRE - PHQ9
1. LITTLE INTEREST OR PLEASURE IN DOING THINGS: NOT AT ALL
2. FEELING DOWN, DEPRESSED OR HOPELESS: NOT AT ALL
SUM OF ALL RESPONSES TO PHQ9 QUESTIONS 1 AND 2: 0

## 2024-10-23 NOTE — PROGRESS NOTES
Subjective   Patient ID: Galina Rocha is a 44 y.o. female who presents for Annual Exam.    Past Medical, Surgical, and Family History reviewed and updated in chart.    Reviewed all medications by prescribing practitioner or clinical pharmacist (such as prescriptions, OTCs, herbal therapies and supplements) and documented in the medical record.    HPI  1.  Physical exam.  Pap: October 2023, negative HPV  Mammogram: March 22, 2024, normal.   Immunizations: Tdap 5/2019, consider influenza vaccine  She is feeling very well today without any significant concerns  Since her last visit with me, she suffered from shingles of the right vaginal area  Not opposed to getting the Shingrix vaccination, but not certain it would be covered by the insurance    2.  Migraine headaches.  Migraine history, relatively well-controlled, not intractable  Has a medical marijuana card for which she will use medication on an as-needed basis    3.  Sensorineural hearing loss.  Since last visit has been seen by audiologist who indicated to her that she now has about a 65% hearing loss in both ears    Review of Systems  All pertinent positive symptoms are included in the history of present illness.    All other systems have been reviewed and are negative and noncontributory to this patient's current ailments.    History reviewed. No pertinent past medical history.  Past Surgical History:   Procedure Laterality Date    OTHER SURGICAL HISTORY  08/30/2018    Prior Surgical Procedure Not Done     Social History     Tobacco Use    Smoking status: Some Days     Current packs/day: 0.00     Types: Cigarettes     Passive exposure: Never    Smokeless tobacco: Never   Vaping Use    Vaping status: Never Used   Substance Use Topics    Alcohol use: Yes     Alcohol/week: 1.0 standard drink of alcohol     Types: 1 Glasses of wine per week    Drug use: Yes     Frequency: 1.0 times per week     Types: Marijuana     Family History   Problem Relation Name Age of  "Onset    Thyroid disease Mother      Diabetes Father      Hypertension Father       Immunization History   Administered Date(s) Administered    DTaP vaccine, pediatric  (INFANRIX) 05/10/2019    Flu vaccine, trivalent, preservative free, age 6 months and greater (Fluarix/Fluzone/Flulaval) 10/23/2024    Hepatitis B vaccine, adult *Check Product/Dose* 06/07/2002, 07/29/2002, 02/10/2003    Influenza, Unspecified 12/09/2007, 10/14/2009, 08/28/2019    Influenza, seasonal, injectable 10/03/2019, 10/12/2020    Meningococcal MPSV4 06/07/2002    Moderna SARS-CoV-2 Vaccination 03/22/2021, 04/16/2021    PPD Test 06/07/2002    Pfizer Purple Cap SARS-CoV-2 12/29/2021    Td vaccine, age 7 years and older (TDVAX) 01/01/1999    Tdap vaccine, age 7 year and older (BOOSTRIX, ADACEL) 08/10/2009, 05/10/2019     Current Outpatient Medications   Medication Instructions    levonorgestrel (Mirena) 21 mcg/24 hours (8 yrs) 52 mg IUD intrauterine    mometasone (Elocon) 0.1 % cream Topical, Daily     No Known Allergies    Objective   Vitals:    10/23/24 1314   BP: 120/70   Pulse: 59   Weight: 54 kg (119 lb)   Height: 1.499 m (4' 11\")     Body mass index is 24.04 kg/m².    BP Readings from Last 3 Encounters:   10/23/24 120/70   05/23/24 111/74   05/21/24 133/90      Wt Readings from Last 3 Encounters:   10/23/24 54 kg (119 lb)   05/23/24 52.2 kg (115 lb)   05/21/24 51.4 kg (113 lb 6.4 oz)        Office Visit on 10/23/2024   Component Date Value    POC Color, Urine 10/23/2024 Yellow     POC Appearance, Urine 10/23/2024 Clear     POC Glucose, Urine 10/23/2024 NEGATIVE     POC Bilirubin, Urine 10/23/2024 NEGATIVE     POC Ketones, Urine 10/23/2024 NEGATIVE     POC Specific Gravity, Ur* 10/23/2024 1.025     POC Blood, Urine 10/23/2024 NEGATIVE     POC PH, Urine 10/23/2024 6.5     POC Protein, Urine 10/23/2024 NEGATIVE     POC Urobilinogen, Urine 10/23/2024 0.2     Poc Nitrite, Urine 10/23/2024 NEGATIVE     POC Leukocytes, Urine 10/23/2024 NEGATIVE  "     Physical Exam  CONSTITUTIONAL - well nourished, well developed, looks like stated age, in no acute distress, not ill-appearing, and not tired appearing  SKIN - normal skin color and pigmentation, normal skin turgor without rash, lesions, or nodules visualized  HEAD - no trauma, normocephalic  EYES - pupils are equal and reactive to light, extraocular muscles are intact, and normal external exam  ENT - TM's intact after removal of hearing aids, no injection, no signs of infection, uvula midline, normal tongue movement and throat normal, no exudate  NECK - supple without rigidity, no neck mass was observed, no thyromegaly or thyroid nodules  CHEST - clear to auscultation, no wheezing, no crackles and no rales, good effort  CARDIAC - bradycardic rate and regular rhythm, no skipped beats, no murmur  ABDOMEN - no organomegaly, soft, nontender, nondistended, normal bowel sounds, no guarding/rebound/rigidity, negative McBurney sign and negative Cary sign  EXTREMITIES - no obvious or evident edema, no obvious or evident deformities  NEUROLOGICAL - normal gait, normal balance, normal motor, no ataxia, DTRs equal and symmetrical; alert, oriented and no focal signs  PSYCHIATRIC - alert, pleasant and cordial, age-appropriate  IMMUNOLOGIC - no cervical lymphadenopathy    Assessment/Plan   Problem List Items Addressed This Visit       Sensorineural hearing loss of both ears     Continue to follow with audiology and ENT per protocol         Annual physical exam - Primary     Complete history and physical examination was performed  EKG reveals sinus bradycardia without acute changes  We will notify of test results once available         Relevant Orders    Lipid Panel    TSH with reflex to Free T4 if abnormal    Comprehensive Metabolic Panel    CBC and Auto Differential    POCT UA (nonautomated) manually resulted (Completed)    ECG 12 Lead (Completed)     Other Visit Diagnoses       History of shingles        Highly recommend  considering Shingrix  Check insurance to determine coverage    Need for influenza vaccination        All questions were answered and you were counseled on immunization(s) in detail and vaccination was provided    Relevant Orders    Flu vaccine, trivalent, preservative free, age 6 months and greater (Fluraix/Fluzone/Flulaval) (Completed)

## 2024-12-09 ENCOUNTER — TELEMEDICINE (OUTPATIENT)
Dept: PRIMARY CARE | Facility: CLINIC | Age: 44
End: 2024-12-09
Payer: MEDICARE

## 2024-12-09 DIAGNOSIS — J01.40 ACUTE NON-RECURRENT PANSINUSITIS: Primary | ICD-10-CM

## 2024-12-09 PROCEDURE — 99214 OFFICE O/P EST MOD 30 MIN: CPT | Performed by: FAMILY MEDICINE

## 2024-12-09 PROCEDURE — 4004F PT TOBACCO SCREEN RCVD TLK: CPT | Performed by: FAMILY MEDICINE

## 2024-12-09 RX ORDER — AMOXICILLIN AND CLAVULANATE POTASSIUM 875; 125 MG/1; MG/1
875 TABLET, FILM COATED ORAL 2 TIMES DAILY
Qty: 14 TABLET | Refills: 0 | Status: SHIPPED | OUTPATIENT
Start: 2024-12-09 | End: 2024-12-16

## 2024-12-09 NOTE — PROGRESS NOTES
Subjective   Patient ID: Galina Rcoha is a 44 y.o. female who presents virtually for Sinusitis.    Past Medical, Surgical, and Family History reviewed and updated in chart.    Reviewed all medications by prescribing practitioner or clinical pharmacist (such as prescriptions, OTCs, herbal therapies and supplements) and documented in the medical record.    HPI  Galina began experiencing symptoms on December 5, starting with a sore throat and a cough due to congestion and drainage. Although the sore throat has resolved, she remains significantly congested and is currently taking Tylenol Cold and Flu along with supplements to manage her symptoms.     She has felt clammy but has not recorded a fever above 99°F. As she is planning to travel out of town soon, Galina is concerned about managing her symptoms during her trip and ensuring access to necessary medications while away.    Review of Systems  All pertinent positive symptoms are included in the history of present illness.    All other systems have been reviewed and are negative and noncontributory to this patient's current ailments.    History reviewed. No pertinent past medical history.  Past Surgical History:   Procedure Laterality Date    OTHER SURGICAL HISTORY  08/30/2018    Prior Surgical Procedure Not Done     Social History     Tobacco Use    Smoking status: Some Days     Current packs/day: 0.00     Types: Cigarettes     Passive exposure: Never    Smokeless tobacco: Never   Vaping Use    Vaping status: Never Used   Substance Use Topics    Alcohol use: Yes     Alcohol/week: 1.0 standard drink of alcohol     Types: 1 Glasses of wine per week    Drug use: Yes     Frequency: 1.0 times per week     Types: Marijuana     Family History   Problem Relation Name Age of Onset    Thyroid disease Mother      Diabetes Father      Hypertension Father       Immunization History   Administered Date(s) Administered    COVID-19, mRNA, LNP-S, PF, 30 mcg/0.3 mL dose  12/29/2021    DTaP vaccine, pediatric  (INFANRIX) 05/10/2019    Flu vaccine, trivalent, preservative free, age 6 months and greater (Fluarix/Fluzone/Flulaval) 10/23/2024    Hepatitis B vaccine, adult *Check Product/Dose* 06/07/2002, 07/29/2002, 02/10/2003    Influenza, Unspecified 12/09/2007, 10/14/2009, 08/28/2019    Influenza, seasonal, injectable 10/03/2019, 10/12/2020    Meningococcal MPSV4 06/07/2002    Moderna SARS-CoV-2 Vaccination 03/22/2021, 04/16/2021    PPD Test 06/07/2002    Td vaccine, age 7 years and older (TDVAX) 01/01/1999    Tdap vaccine, age 7 year and older (BOOSTRIX, ADACEL) 08/10/2009, 05/10/2019     Current Outpatient Medications   Medication Instructions    amoxicillin-pot clavulanate (Augmentin) 875-125 mg tablet 875 mg, oral, 2 times daily    levonorgestrel (Mirena) 21 mcg/24 hours (8 yrs) 52 mg IUD intrauterine    mometasone (Elocon) 0.1 % cream Topical, Daily     No Known Allergies    Objective   There were no vitals filed for this visit.  There is no height or weight on file to calculate BMI.    BP Readings from Last 3 Encounters:   10/23/24 120/70   05/23/24 111/74   05/21/24 133/90      Wt Readings from Last 3 Encounters:   10/23/24 54 kg (119 lb)   05/23/24 52.2 kg (115 lb)   05/21/24 51.4 kg (113 lb 6.4 oz)      Physical Exam  CONSTITUTIONAL - well nourished, well developed, looks like stated age, in no acute distress, not ill-appearing, and not tired appearing  SKIN - normal skin color and pigmentation, normal skin turgor without rash, lesions, or nodules visualized  HEAD - no trauma, normocephalic  EYES - pupils are equal and reactive to light, extraocular muscles are intact, and normal external exam  CHEST - congestion, coughing, good effort  NEUROLOGICAL -  alert, oriented and no focal signs  PSYCHIATRIC - alert, pleasant and cordial, age-appropriate    Assessment/Plan   Problem List Items Addressed This Visit       Acute non-recurrent pansinusitis - Primary     sudhakar Mojica  talked about making sure you have a plan in place for your cold and sinus symptoms, especially since you’ll be traveling soon. Remember, if your symptoms don't improve in about a week, we might consider starting antibiotics, but only if necessary. Keep up with the supportive measures you've been doing.    We went over the different treatment options, including the risks and benefits, after reviewing your current medications and any allergies. You agreed with the plan we discussed, and I’ve given you instructions on how to use the medications for your symptoms.    It's important to continue taking care of yourself--get plenty of rest, stay hydrated, and use Advil or Tylenol if you need them for relief. If your symptoms get worse or don't improve, please come back to the clinic in 7-10 days, or sooner if needed, so we can reassess. Safe travels!         Relevant Medications    amoxicillin-pot clavulanate (Augmentin) 875-125 mg tablet

## 2024-12-09 NOTE — ASSESSMENT & PLAN NOTE
Galina, we talked about making sure you have a plan in place for your cold and sinus symptoms, especially since you’ll be traveling soon. Remember, if your symptoms don't improve in about a week, we might consider starting antibiotics, but only if necessary. Keep up with the supportive measures you've been doing.    We went over the different treatment options, including the risks and benefits, after reviewing your current medications and any allergies. You agreed with the plan we discussed, and I’ve given you instructions on how to use the medications for your symptoms.    It's important to continue taking care of yourself--get plenty of rest, stay hydrated, and use Advil or Tylenol if you need them for relief. If your symptoms get worse or don't improve, please come back to the clinic in 7-10 days, or sooner if needed, so we can reassess. Safe travels!